# Patient Record
Sex: FEMALE | Race: BLACK OR AFRICAN AMERICAN | NOT HISPANIC OR LATINO | URBAN - METROPOLITAN AREA
[De-identification: names, ages, dates, MRNs, and addresses within clinical notes are randomized per-mention and may not be internally consistent; named-entity substitution may affect disease eponyms.]

---

## 2018-12-27 ENCOUNTER — EMERGENCY (EMERGENCY)
Facility: HOSPITAL | Age: 28
LOS: 0 days | Discharge: HOME | End: 2018-12-28
Attending: EMERGENCY MEDICINE | Admitting: EMERGENCY MEDICINE

## 2018-12-27 VITALS
TEMPERATURE: 97 F | DIASTOLIC BLOOD PRESSURE: 92 MMHG | SYSTOLIC BLOOD PRESSURE: 137 MMHG | HEART RATE: 72 BPM | RESPIRATION RATE: 18 BRPM | OXYGEN SATURATION: 98 %

## 2018-12-27 DIAGNOSIS — F17.210 NICOTINE DEPENDENCE, CIGARETTES, UNCOMPLICATED: ICD-10-CM

## 2018-12-27 DIAGNOSIS — R55 SYNCOPE AND COLLAPSE: ICD-10-CM

## 2018-12-28 VITALS
TEMPERATURE: 98 F | RESPIRATION RATE: 18 BRPM | DIASTOLIC BLOOD PRESSURE: 74 MMHG | OXYGEN SATURATION: 100 % | HEART RATE: 58 BPM | SYSTOLIC BLOOD PRESSURE: 115 MMHG

## 2018-12-28 LAB
ALBUMIN SERPL ELPH-MCNC: 4.2 G/DL — SIGNIFICANT CHANGE UP (ref 3.5–5.2)
ALP SERPL-CCNC: 72 U/L — SIGNIFICANT CHANGE UP (ref 30–115)
ALT FLD-CCNC: 12 U/L — SIGNIFICANT CHANGE UP (ref 0–41)
ANION GAP SERPL CALC-SCNC: 16 MMOL/L — HIGH (ref 7–14)
AST SERPL-CCNC: 14 U/L — SIGNIFICANT CHANGE UP (ref 0–41)
BASOPHILS # BLD AUTO: 0.02 K/UL — SIGNIFICANT CHANGE UP (ref 0–0.2)
BASOPHILS NFR BLD AUTO: 0.3 % — SIGNIFICANT CHANGE UP (ref 0–1)
BILIRUB SERPL-MCNC: 0.2 MG/DL — SIGNIFICANT CHANGE UP (ref 0.2–1.2)
BUN SERPL-MCNC: 14 MG/DL — SIGNIFICANT CHANGE UP (ref 10–20)
CALCIUM SERPL-MCNC: 9.5 MG/DL — SIGNIFICANT CHANGE UP (ref 8.5–10.1)
CHLORIDE SERPL-SCNC: 101 MMOL/L — SIGNIFICANT CHANGE UP (ref 98–110)
CHOLEST SERPL-MCNC: 195 MG/DL — SIGNIFICANT CHANGE UP (ref 100–200)
CO2 SERPL-SCNC: 23 MMOL/L — SIGNIFICANT CHANGE UP (ref 17–32)
CREAT SERPL-MCNC: 0.7 MG/DL — SIGNIFICANT CHANGE UP (ref 0.7–1.5)
D DIMER BLD IA.RAPID-MCNC: 187 NG/ML DDU — SIGNIFICANT CHANGE UP (ref 0–230)
EOSINOPHIL # BLD AUTO: 0.14 K/UL — SIGNIFICANT CHANGE UP (ref 0–0.7)
EOSINOPHIL NFR BLD AUTO: 1.8 % — SIGNIFICANT CHANGE UP (ref 0–8)
GLUCOSE SERPL-MCNC: 104 MG/DL — HIGH (ref 70–99)
HCT VFR BLD CALC: 41.9 % — SIGNIFICANT CHANGE UP (ref 37–47)
HDLC SERPL-MCNC: 39 MG/DL — LOW
HGB BLD-MCNC: 13.6 G/DL — SIGNIFICANT CHANGE UP (ref 12–16)
IMM GRANULOCYTES NFR BLD AUTO: 0.5 % — HIGH (ref 0.1–0.3)
LACTATE SERPL-SCNC: 1.3 MMOL/L — SIGNIFICANT CHANGE UP (ref 0.5–2.2)
LIPID PNL WITH DIRECT LDL SERPL: 145 MG/DL — HIGH (ref 4–129)
LYMPHOCYTES # BLD AUTO: 2.37 K/UL — SIGNIFICANT CHANGE UP (ref 1.2–3.4)
LYMPHOCYTES # BLD AUTO: 31.2 % — SIGNIFICANT CHANGE UP (ref 20.5–51.1)
MAGNESIUM SERPL-MCNC: 2.1 MG/DL — SIGNIFICANT CHANGE UP (ref 1.8–2.4)
MCHC RBC-ENTMCNC: 25.7 PG — LOW (ref 27–31)
MCHC RBC-ENTMCNC: 32.5 G/DL — SIGNIFICANT CHANGE UP (ref 32–37)
MCV RBC AUTO: 79.2 FL — LOW (ref 81–99)
MONOCYTES # BLD AUTO: 0.6 K/UL — SIGNIFICANT CHANGE UP (ref 0.1–0.6)
MONOCYTES NFR BLD AUTO: 7.9 % — SIGNIFICANT CHANGE UP (ref 1.7–9.3)
NEUTROPHILS # BLD AUTO: 4.42 K/UL — SIGNIFICANT CHANGE UP (ref 1.4–6.5)
NEUTROPHILS NFR BLD AUTO: 58.3 % — SIGNIFICANT CHANGE UP (ref 42.2–75.2)
NRBC # BLD: 0 /100 WBCS — SIGNIFICANT CHANGE UP (ref 0–0)
PHOSPHATE SERPL-MCNC: 4.3 MG/DL — SIGNIFICANT CHANGE UP (ref 2.1–4.9)
PLATELET # BLD AUTO: 353 K/UL — SIGNIFICANT CHANGE UP (ref 130–400)
POTASSIUM SERPL-MCNC: 4.2 MMOL/L — SIGNIFICANT CHANGE UP (ref 3.5–5)
POTASSIUM SERPL-SCNC: 4.2 MMOL/L — SIGNIFICANT CHANGE UP (ref 3.5–5)
PROT SERPL-MCNC: 8.3 G/DL — HIGH (ref 6–8)
RBC # BLD: 5.29 M/UL — SIGNIFICANT CHANGE UP (ref 4.2–5.4)
RBC # FLD: 14.4 % — SIGNIFICANT CHANGE UP (ref 11.5–14.5)
SODIUM SERPL-SCNC: 140 MMOL/L — SIGNIFICANT CHANGE UP (ref 135–146)
TOTAL CHOLESTEROL/HDL RATIO MEASUREMENT: 5 RATIO — SIGNIFICANT CHANGE UP (ref 4–5.5)
TRIGL SERPL-MCNC: 106 MG/DL — SIGNIFICANT CHANGE UP (ref 10–149)
TROPONIN T SERPL-MCNC: <0.01 NG/ML — SIGNIFICANT CHANGE UP
TROPONIN T SERPL-MCNC: <0.01 NG/ML — SIGNIFICANT CHANGE UP
WBC # BLD: 7.59 K/UL — SIGNIFICANT CHANGE UP (ref 4.8–10.8)
WBC # FLD AUTO: 7.59 K/UL — SIGNIFICANT CHANGE UP (ref 4.8–10.8)

## 2018-12-28 RX ORDER — IBUPROFEN 200 MG
600 TABLET ORAL ONCE
Qty: 0 | Refills: 0 | Status: COMPLETED | OUTPATIENT
Start: 2018-12-28 | End: 2018-12-28

## 2018-12-28 RX ORDER — SODIUM CHLORIDE 9 MG/ML
1000 INJECTION INTRAMUSCULAR; INTRAVENOUS; SUBCUTANEOUS ONCE
Qty: 0 | Refills: 0 | Status: COMPLETED | OUTPATIENT
Start: 2018-12-28 | End: 2018-12-28

## 2018-12-28 RX ADMIN — Medication 600 MILLIGRAM(S): at 01:25

## 2018-12-28 RX ADMIN — Medication 600 MILLIGRAM(S): at 13:56

## 2018-12-28 RX ADMIN — SODIUM CHLORIDE 2000 MILLILITER(S): 9 INJECTION INTRAMUSCULAR; INTRAVENOUS; SUBCUTANEOUS at 01:00

## 2018-12-28 NOTE — ED ADULT NURSE REASSESSMENT NOTE - NS ED NURSE REASSESS COMMENT FT1
patient placed in observation for further observation for syncope and collapse. Patient placed on cardiac and pulse ox monitor. NSR.  Patient offers no complaints at this time. Patient in stable condition and nad.  Will continue to monitor.

## 2018-12-28 NOTE — ED PROVIDER NOTE - ATTENDING CONTRIBUTION TO CARE
29 yo F, no known history, here for assessment sp syncope vs seizure -- patient reports she was feeling normal, was holding her son and then suddenly felt lightheaded, put her son down and was planning to go speak to her mom however she lost consciousness and fell forward, hitting forehead on the ground.    Episode was unwitnessed, unclear duration of LOC or if there was seizure activity, however patient was incontinent of urine.     Currently has pain to forehead and lip. Upon waking from episode was back to neurological baseline.    No recent illness, headache, chest pain, shortness of breath, preceding head trauma. Had single cocktail earlier in the evening, has had recent sleep deprivation.     Given urinary incontinence concern for complicated syncope vs seizure as opposed to simple vagal episode. Labs, CT, UA and EKG are unremarkable, but will place in obs for brief observation, consideration of neuro eval for possible seizure in setting of LOC with incontinence.

## 2018-12-28 NOTE — ED PROVIDER NOTE - OBJECTIVE STATEMENT
28yF with no pmh p/w syncopal episode this past evening. pt was holding her 4 year old, standing and talking to her mother downstairs and began to feel lightheaded and slightly nauseous/weak. she put her son down and then fell forward onto her forehead. Pt then had black spots in her vision on waking. incident was witnessed only by her 4 year old. pt states she urinated herself. no fecal incont, no tongue biting but her lower lip is sore. pt thinks she was out only a few moments. no prior syncopal episodes no sz history. pt has been very sleep deprived per her report and had 1 cocktail prior to incident. she states she felt "buzzed" at the time.

## 2018-12-28 NOTE — ED CDU PROVIDER DISPOSITION NOTE - CLINICAL COURSE
27yo woman, smoker on nexplanon, was placed in CDU for workup of syncope preceded by dizziness. She reports she has been working a lot, not sleeping much, and stressed; had an alcoholic drink prior to the episode. She did have some urinary incontinence but no tongue biting, was back to baseline after about 30 seconds, no chest pain, abd pain, fever, chills, SOB. Pt was eval in the ED with EKG, labs with cardiac enzymes, CXR which were unremarkable. D-dimer was negative. Pt comfortable on my eval, lungs CTA, CVS1S2 RRR. Repeat EKG and enzymes unchanged, pt monitored without incident and remained asymptomatic. Echo was unremarkable however pt eloped prior to being discharged. I called and spoke with her, discussed results. She will f/u PMD and return to the ED to  results.

## 2018-12-28 NOTE — ED CDU PROVIDER INITIAL DAY NOTE - ATTENDING CONTRIBUTION TO CARE
27yo woman, smoker on nexplanon, was placed in CDU for workup of syncope preceded by dizziness. She reports she has been working a lot, not sleeping much, and stressed; had an alcoholic drink prior to the episode. She did have some urinary incontinence but no tongue biting, was back to baseline after about 30 seconds, no chest pain, abd pain, fever, chills, SOB. Pt was eval in the ED with EKG, labs with cardiac enzymes, CXR which were unremarkable. D-dimer was negative. Pt comfortable on my eval, lungs CTA, CVS1S2 RRR. Plan for serial enzymes, echo, reassess.

## 2018-12-28 NOTE — ED CDU PROVIDER INITIAL DAY NOTE - OBJECTIVE STATEMENT
pt is a 28y female with no pmhx comes to ed c/o syncopal episode last night while holding her 5yo son and speaking to her mom. Pt thens suddenly started to feel weak and lightheaded and passed out. pt reports to sleep deprivation and had a cocktail before the incident happened. Pt fell on her face and was able to put her son down first. pt reports seeing black spots in vision and urinating on herself. pt was out for 1/2min and recalls having a syncopal episode about 6yrs ago. pt denies and SOB, chest pain, Vomiting, nausea, diaphoresis, abdominal pain, back pain.

## 2018-12-28 NOTE — ED ADULT NURSE NOTE - NSIMPLEMENTINTERV_GEN_ALL_ED
Implemented All Universal Safety Interventions:  [place] to call system. Call bell, personal items and telephone within reach. Instruct patient to call for assistance. Room bathroom lighting operational. Non-slip footwear when patient is off stretcher. Physically safe environment: no spills, clutter or unnecessary equipment. Stretcher in lowest position, wheels locked, appropriate side rails in place.

## 2018-12-28 NOTE — ED CDU PROVIDER INITIAL DAY NOTE - MEDICAL DECISION MAKING DETAILS
29yo woman, smoker on nexplanon, was placed in CDU for workup of syncope preceded by dizziness. She reports she has been working a lot, not sleeping much, and stressed; had an alcoholic drink prior to the episode. She did have some urinary incontinence but no tongue biting, was back to baseline after about 30 seconds, no chest pain, abd pain, fever, chills, SOB. Pt was eval in the ED with EKG, labs with cardiac enzymes, CXR which were unremarkable. D-dimer was negative. Pt comfortable on my eval, lungs CTA, CVS1S2 RRR. Plan for serial enzymes, echo, reassess.

## 2019-06-19 ENCOUNTER — EMERGENCY (EMERGENCY)
Facility: HOSPITAL | Age: 29
LOS: 0 days | Discharge: HOME | End: 2019-06-19
Admitting: EMERGENCY MEDICINE
Payer: COMMERCIAL

## 2019-06-19 VITALS
SYSTOLIC BLOOD PRESSURE: 121 MMHG | RESPIRATION RATE: 20 BRPM | OXYGEN SATURATION: 100 % | DIASTOLIC BLOOD PRESSURE: 68 MMHG | HEART RATE: 80 BPM

## 2019-06-19 DIAGNOSIS — J45.909 UNSPECIFIED ASTHMA, UNCOMPLICATED: ICD-10-CM
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
## 2019-06-19 DIAGNOSIS — F17.200 NICOTINE DEPENDENCE, UNSPECIFIED, UNCOMPLICATED: ICD-10-CM

## 2019-06-19 DIAGNOSIS — S39.012A STRAIN OF MUSCLE, FASCIA AND TENDON OF LOWER BACK, INITIAL ENCOUNTER: ICD-10-CM
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
## 2019-06-19 DIAGNOSIS — M54.9 DORSALGIA, UNSPECIFIED: ICD-10-CM
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
## 2019-06-19 PROCEDURE — 99283 EMERGENCY DEPT VISIT LOW MDM: CPT

## 2019-06-19 RX ORDER — KETOROLAC TROMETHAMINE 30 MG/ML
30 SYRINGE (ML) INJECTION ONCE
Refills: 0 | Status: COMPLETED | OUTPATIENT
Start: 2019-06-19 | End: 2019-06-19

## 2019-06-19 NOTE — ED PROVIDER NOTE - CLINICAL SUMMARY MEDICAL DECISION MAKING FREE TEXT BOX
NSAIDS prn; rehab referral; pt will follow up with PCP in 2-3 days; any new or worsening symptoms, pt will return to ER.
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
## 2019-06-19 NOTE — ED PROVIDER NOTE - MUSCULOSKELETAL, MLM
Spine appears normal, range of motion is not limited, no midline tenderness.  (+) bilateral lower lumbar paraspinal tenderness with spasm.
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
## 2019-06-19 NOTE — ED PROVIDER NOTE - NSFOLLOWUPINSTRUCTIONS_ED_ALL_ED_FT
****Ibuprofen 600mg every 6 hours as needed****      Low Back Sprain  Image   A sprain is a stretch or tear in the bands of tissue that hold bones and joints together (ligaments). Sprains of the lower back (lumbar spine) are a common cause of low back pain. A sprain occurs when ligaments are overextended or stretched beyond their limits. The ligaments can become inflamed, resulting in pain and sudden muscle tightening (spasms). A sprain can be caused by an injury (trauma), or it can develop gradually due to overuse.    There are three types of sprains:  Grade 1 is a mild sprain involving an overstretched ligament or a very slight tear of the ligament.  Grade 2 is a moderate sprain involving a partial tear of the ligament.  Grade 3 is a severe sprain involving a complete tear of the ligament.  What are the causes?  This condition may be caused by:  Trauma, such as a fall or a hit to the body.  Twisting or overstretching the back. This may result from doing activities that require a lot of energy, such as lifting heavy objects.  What increases the risk?  The following factors may increase your risk of getting this condition:  Playing contact sports.  Participating in sports or activities that put excessive stress on the back and require a lot of bending and twisting, including:  Lifting weights or heavy objects.  Gymnastics.  Soccer.  Figure skating.  Snowboarding.  Being overweight or obese.  Having poor strength and flexibility.  What are the signs or symptoms?  Symptoms of this condition may include:  Sharp or dull pain in the lower back that does not go away. Pain may extend to the buttocks.  Stiffness.  Limited range of motion.  Inability to stand up straight due to stiffness or pain.  Muscle spasms.  How is this diagnosed?  ImageThis condition may be diagnosed based on:  Your symptoms.  Your medical history.  A physical exam.  Your health care provider may push on certain areas of your back to determine the source of your pain.  You may be asked to bend forward, backward, and side to side to assess the severity of your pain and your range of motion.  Imaging tests, such as:  X-rays.  MRI.  How is this treated?  Treatment for this condition may include:  Applying heat and cold to the affected area.  Medicines to help relieve pain and to relax your muscles (muscle relaxants).  NSAIDs to help reduce swelling and discomfort.  Physical therapy.  When your symptoms improve, it is important to gradually return to your normal routine as soon as possible to reduce pain, avoid stiffness, and avoid loss of muscle strength. Generally, symptoms should improve within 6 weeks of treatment. However, recovery time varies.    Follow these instructions at home:  Managing pain, stiffness, and swelling     Image   If directed, apply ice to the injured area during the first 24 hours after your injury.  Put ice in a plastic bag.  Place a towel between your skin and the bag.  Leave the ice on for 20 minutes, 2–3 times a day.  If directed, apply heat to the affected area as often as told by your health care provider. Use the heat source that your health care provider recommends, such as a moist heat pack or a heating pad.  Place a towel between your skin and the heat source.  Leave the heat on for 20–30 minutes.  Remove the heat if your skin turns bright red. This is especially important if you are unable to feel pain, heat, or cold. You may have a greater risk of getting burned.  Activity     Rest and return to your normal activities as told by your health care provider. Ask your health care provider what activities are safe for you.  Avoid activities that take a lot of effort (are strenuous) for as long as told by your health care provider.  Do exercises as told by your health care provider.  General instructions     Image   Take over-the-counter and prescription medicines only as told by your health care provider.  If you have questions or concerns about safety while taking pain medicine, talk with your health care provider.  Do not drive or operate heavy machinery until you know how your pain medicine affects you.  Do not use any tobacco products, such as cigarettes, chewing tobacco, and e-cigarettes. Tobacco can delay bone healing. If you need help quitting, ask your health care provider.  Keep all follow-up visits as told by your health care provider. This is important.  How is this prevented?  Warm up and stretch before being active.  Cool down and stretch after being active.  Give your body time to rest between periods of activity.  Avoid:  Being physically inactive for long periods at a time.  Exercising or playing sports when you are tired or in pain.  Use correct form when playing sports and lifting heavy objects.  Use good posture when sitting and standing.  Maintain a healthy weight.  Sleep on a mattress with medium firmness to support your back.  Make sure to use equipment that fits you, including shoes that fit well.  Be safe and responsible while being active to avoid falls.  Do at least 150 minutes of moderate-intensity exercise each week, such as brisk walking or water aerobics. Try a form of exercise that takes stress off your back, such as swimming or stationary cycling.  Maintain physical fitness, including:  Strength. In particular, develop and maintain strong abdominal muscles.  Flexibility.  Cardiovascular fitness.  Endurance.  Contact a health care provider if:  Your back pain does not improve after 6 weeks of treatment.  Your symptoms get worse.  Get help right away if:  Your back pain is severe.  You are unable to stand or walk.  You develop pain in your legs.  You develop weakness in your buttocks or legs.  You have difficulty controlling when you urinate or when you have a bowel movement.  This information is not intended to replace advice given to you by your health care provider. Make sure you discuss any questions you have with your health care provider.    Document Released: 12/18/2006 Document Revised: 08/24/2017 Document Reviewed: 09/28/2016  [employer] Interactive Patient Education © 2019 [employer]

## 2022-08-26 NOTE — ED ADULT TRIAGE NOTE - NS ED NOTE AC HIGH RISK COUNTRIES
[de-identified] : 
No

## 2024-11-08 ENCOUNTER — EMERGENCY (EMERGENCY)
Facility: HOSPITAL | Age: 34
LOS: 0 days | Discharge: HOME | End: 2024-11-08
Attending: STUDENT IN AN ORGANIZED HEALTH CARE EDUCATION/TRAINING PROGRAM
Payer: COMMERCIAL

## 2024-11-08 VITALS
RESPIRATION RATE: 18 BRPM | SYSTOLIC BLOOD PRESSURE: 121 MMHG | OXYGEN SATURATION: 99 % | TEMPERATURE: 99 F | DIASTOLIC BLOOD PRESSURE: 79 MMHG | WEIGHT: 184.09 LBS | HEART RATE: 75 BPM

## 2024-11-08 PROBLEM — Z78.9 OTHER SPECIFIED HEALTH STATUS: Chronic | Status: ACTIVE | Noted: 2019-06-19

## 2024-11-08 LAB
ALBUMIN SERPL ELPH-MCNC: 4 G/DL — SIGNIFICANT CHANGE UP (ref 3.5–5.2)
ALP SERPL-CCNC: 61 U/L — SIGNIFICANT CHANGE UP (ref 30–115)
ALT FLD-CCNC: 10 U/L — SIGNIFICANT CHANGE UP (ref 0–41)
ANION GAP SERPL CALC-SCNC: 13 MMOL/L — SIGNIFICANT CHANGE UP (ref 7–14)
APPEARANCE UR: CLEAR — SIGNIFICANT CHANGE UP
AST SERPL-CCNC: 15 U/L — SIGNIFICANT CHANGE UP (ref 0–41)
BACTERIA # UR AUTO: NEGATIVE /HPF — SIGNIFICANT CHANGE UP
BASOPHILS # BLD AUTO: 0.02 K/UL — SIGNIFICANT CHANGE UP (ref 0–0.2)
BASOPHILS NFR BLD AUTO: 0.4 % — SIGNIFICANT CHANGE UP (ref 0–1)
BILIRUB SERPL-MCNC: <0.2 MG/DL — SIGNIFICANT CHANGE UP (ref 0.2–1.2)
BILIRUB UR-MCNC: NEGATIVE — SIGNIFICANT CHANGE UP
BLD GP AB SCN SERPL QL: SIGNIFICANT CHANGE UP
BUN SERPL-MCNC: 10 MG/DL — SIGNIFICANT CHANGE UP (ref 10–20)
CALCIUM SERPL-MCNC: 8.8 MG/DL — SIGNIFICANT CHANGE UP (ref 8.4–10.4)
CAST: 0 /LPF — SIGNIFICANT CHANGE UP (ref 0–4)
CHLORIDE SERPL-SCNC: 105 MMOL/L — SIGNIFICANT CHANGE UP (ref 98–110)
CO2 SERPL-SCNC: 20 MMOL/L — SIGNIFICANT CHANGE UP (ref 17–32)
COLOR SPEC: YELLOW — SIGNIFICANT CHANGE UP
CREAT SERPL-MCNC: 0.6 MG/DL — LOW (ref 0.7–1.5)
DIFF PNL FLD: ABNORMAL
EGFR: 121 ML/MIN/1.73M2 — SIGNIFICANT CHANGE UP
EOSINOPHIL # BLD AUTO: 0.17 K/UL — SIGNIFICANT CHANGE UP (ref 0–0.7)
EOSINOPHIL NFR BLD AUTO: 3.1 % — SIGNIFICANT CHANGE UP (ref 0–8)
GLUCOSE SERPL-MCNC: 112 MG/DL — HIGH (ref 70–99)
GLUCOSE UR QL: NEGATIVE MG/DL — SIGNIFICANT CHANGE UP
HCG SERPL-ACNC: 4132 MIU/ML — HIGH
HCT VFR BLD CALC: 39.7 % — SIGNIFICANT CHANGE UP (ref 37–47)
HGB BLD-MCNC: 12.6 G/DL — SIGNIFICANT CHANGE UP (ref 12–16)
IMM GRANULOCYTES NFR BLD AUTO: 0.4 % — HIGH (ref 0.1–0.3)
KETONES UR-MCNC: NEGATIVE MG/DL — SIGNIFICANT CHANGE UP
LEUKOCYTE ESTERASE UR-ACNC: NEGATIVE — SIGNIFICANT CHANGE UP
LYMPHOCYTES # BLD AUTO: 1.89 K/UL — SIGNIFICANT CHANGE UP (ref 1.2–3.4)
LYMPHOCYTES # BLD AUTO: 34.7 % — SIGNIFICANT CHANGE UP (ref 20.5–51.1)
MCHC RBC-ENTMCNC: 26.7 PG — LOW (ref 27–31)
MCHC RBC-ENTMCNC: 31.7 G/DL — LOW (ref 32–37)
MCV RBC AUTO: 84.1 FL — SIGNIFICANT CHANGE UP (ref 81–99)
MONOCYTES # BLD AUTO: 0.48 K/UL — SIGNIFICANT CHANGE UP (ref 0.1–0.6)
MONOCYTES NFR BLD AUTO: 8.8 % — SIGNIFICANT CHANGE UP (ref 1.7–9.3)
NEUTROPHILS # BLD AUTO: 2.86 K/UL — SIGNIFICANT CHANGE UP (ref 1.4–6.5)
NEUTROPHILS NFR BLD AUTO: 52.6 % — SIGNIFICANT CHANGE UP (ref 42.2–75.2)
NITRITE UR-MCNC: NEGATIVE — SIGNIFICANT CHANGE UP
NRBC # BLD: 0 /100 WBCS — SIGNIFICANT CHANGE UP (ref 0–0)
PH UR: 6 — SIGNIFICANT CHANGE UP (ref 5–8)
PLATELET # BLD AUTO: 328 K/UL — SIGNIFICANT CHANGE UP (ref 130–400)
PMV BLD: 10.1 FL — SIGNIFICANT CHANGE UP (ref 7.4–10.4)
POTASSIUM SERPL-MCNC: 4.2 MMOL/L — SIGNIFICANT CHANGE UP (ref 3.5–5)
POTASSIUM SERPL-SCNC: 4.2 MMOL/L — SIGNIFICANT CHANGE UP (ref 3.5–5)
PROT SERPL-MCNC: 7.4 G/DL — SIGNIFICANT CHANGE UP (ref 6–8)
PROT UR-MCNC: SIGNIFICANT CHANGE UP MG/DL
RBC # BLD: 4.72 M/UL — SIGNIFICANT CHANGE UP (ref 4.2–5.4)
RBC # FLD: 14.1 % — SIGNIFICANT CHANGE UP (ref 11.5–14.5)
RBC CASTS # UR COMP ASSIST: 3 /HPF — SIGNIFICANT CHANGE UP (ref 0–4)
SODIUM SERPL-SCNC: 138 MMOL/L — SIGNIFICANT CHANGE UP (ref 135–146)
SP GR SPEC: 1.02 — SIGNIFICANT CHANGE UP (ref 1–1.03)
SQUAMOUS # UR AUTO: 2 /HPF — SIGNIFICANT CHANGE UP (ref 0–5)
UROBILINOGEN FLD QL: 1 MG/DL — SIGNIFICANT CHANGE UP (ref 0.2–1)
WBC # BLD: 5.44 K/UL — SIGNIFICANT CHANGE UP (ref 4.8–10.8)
WBC # FLD AUTO: 5.44 K/UL — SIGNIFICANT CHANGE UP (ref 4.8–10.8)
WBC UR QL: 3 /HPF — SIGNIFICANT CHANGE UP (ref 0–5)

## 2024-11-08 PROCEDURE — 81001 URINALYSIS AUTO W/SCOPE: CPT

## 2024-11-08 PROCEDURE — 86901 BLOOD TYPING SEROLOGIC RH(D): CPT

## 2024-11-08 PROCEDURE — 85025 COMPLETE CBC W/AUTO DIFF WBC: CPT

## 2024-11-08 PROCEDURE — 36415 COLL VENOUS BLD VENIPUNCTURE: CPT

## 2024-11-08 PROCEDURE — 99285 EMERGENCY DEPT VISIT HI MDM: CPT

## 2024-11-08 PROCEDURE — 80053 COMPREHEN METABOLIC PANEL: CPT

## 2024-11-08 PROCEDURE — 84702 CHORIONIC GONADOTROPIN TEST: CPT

## 2024-11-08 PROCEDURE — 76817 TRANSVAGINAL US OBSTETRIC: CPT

## 2024-11-08 PROCEDURE — 87086 URINE CULTURE/COLONY COUNT: CPT

## 2024-11-08 PROCEDURE — 86900 BLOOD TYPING SEROLOGIC ABO: CPT

## 2024-11-08 PROCEDURE — 76817 TRANSVAGINAL US OBSTETRIC: CPT | Mod: 26

## 2024-11-08 PROCEDURE — 99284 EMERGENCY DEPT VISIT MOD MDM: CPT | Mod: 25

## 2024-11-08 PROCEDURE — 86850 RBC ANTIBODY SCREEN: CPT

## 2024-11-08 RX ORDER — IBUPROFEN 200 MG
1 TABLET ORAL
Qty: 8 | Refills: 0
Start: 2024-11-08 | End: 2024-11-09

## 2024-11-08 RX ORDER — ACETAMINOPHEN 500 MG
975 TABLET ORAL ONCE
Refills: 0 | Status: COMPLETED | OUTPATIENT
Start: 2024-11-08 | End: 2024-11-08

## 2024-11-08 RX ORDER — ONDANSETRON HYDROCHLORIDE 2 MG/ML
4 INJECTION, SOLUTION INTRAMUSCULAR; INTRAVENOUS ONCE
Refills: 0 | Status: COMPLETED | OUTPATIENT
Start: 2024-11-08 | End: 2024-11-08

## 2024-11-08 RX ORDER — MISOPROSTOL 200 UG/1
4 TABLET ORAL
Qty: 8 | Refills: 0
Start: 2024-11-08

## 2024-11-08 RX ORDER — MIFEPRISTONE 200 MG
200 TABLET ORAL ONCE
Refills: 0 | Status: DISCONTINUED | OUTPATIENT
Start: 2024-11-08 | End: 2024-11-08

## 2024-11-08 RX ADMIN — Medication 975 MILLIGRAM(S): at 15:21

## 2024-11-08 NOTE — ED PROVIDER NOTE - PHYSICAL EXAMINATION
VITAL SIGNS: I have reviewed nursing notes and confirm.  CONSTITUTIONAL: Non-toxic, in NAD  SKIN: Warm dry, normal skin turgor  HEAD: NCAT  EYES: No conjunctival injection, scleral anicteric  ENT: Moist mucous membranes, normal pharynx with no erythema or exudates  NECK: Supple; full ROM. Nontender. No cervical LAD  CARD: RRR, no murmurs, rubs or gallops  RESP: Clear to ausculation bilaterally.  No rales, rhonchi, or wheezing.  ABD: Soft, non-distended, non-tender, no rebound or guarding. No CVA tenderness  EXT: Full ROM

## 2024-11-08 NOTE — CONSULT NOTE ADULT - SUBJECTIVE AND OBJECTIVE BOX
PGY2    Chief Complaint: vaginal bleeding, pregnancy    HPI: 33 y/o , LMP  presents with vaginal bleeding during pregnancy. Started bleeding earlier this week, went to [facility] ED, at that time bhcg was found to be approx 5000 with just gestational sac visualized on sono. States was seen at [facility] clinic yesterday, TVUS showed empty gestational sac again, bhcg approx 6400. Has been having moderate bleeding all week, no heavy vaginal bleeding or passage of clots. Was initially planned and desired pregnancy however patient desiring definitive management of suspected missed . Denies fevers, chills, chest pain, SOB, abdominal pain. Denies h/o medical problems.     Ob/Gyn History:  , 3x eTOP w/ 3c D&C, 1x sAb    LMP -     Known h/o fibroid. Denies history of ovarian cysts, abnormal paps, or STIs    Denies the following: constitutional symptoms, visual symptoms, cardiovascular symptoms, respiratory symptoms, GI symptoms, musculoskeletal symptoms, skin symptoms, neurologic symptoms, hematologic symptoms, allergic symptoms, psychiatric symptoms  Except any pertinent positives listed.     Home Medications: None    Allergies: No Known Allergies    PAST MEDICAL & SURGICAL HISTORY:  No pertinent past medical history  H/o D&C x3    FAMILY HISTORY:  No pertinent family history in first degree relatives    SOCIAL HISTORY: Denies cigarette use, alcohol use, or illicit drug use    Vital Signs Last 24 Hrs  T(F): 98.7 (2024 14:14), Max: 98.7 (2024 14:14)  HR: 75 (2024 14:14) (75 - 75)  BP: 121/79 (2024 14:14) (121/79 - 121/79)  RR: 18 (2024 14:14) (18 - 18)    General Appearance - AAOx3, NAD  Abdomen - Soft, nontender, nondistended, no rebound, no rigidity, no guarding  GYN/Pelvis:  External genitalia: Normal  Vagina: 5cc dark red blood  Cervix: soft, closed, no active bleeding  Uterus: small, nontender  Adnexa: nontender    LABS:                        12.6   5.44  )-----------( 328      ( 2024 15:51 )             39.7     HCG Quantitative, Serum: 4132.0 mIU/mL (24 @ 15:52)    ABO RH Interpretation: A POS [2024 16:52  FABIENNEERTong  No historical record of blood group and Rh, requires a second sample for  retype prior to the release of any blood products.  For prenatal, a  second sample on next visit.] (24 @ 15:51)  Antibody Screen: NEG (24 @ 15:51)        138  |  105  |  10  ----------------------------<  112[H]  4.2   |  20  |  0.6[L]    Ca    8.8      2024 15:51    TPro  7.4  /  Alb  4.0  /  TBili  <0.2  /  DBili  x   /  AST  15  /  ALT  10  /  AlkPhos  61        Urinalysis Basic - ( 2024 15:51 )    Color: x / Appearance: x / SG: x / pH: x  Gluc: 112 mg/dL / Ketone: x  / Bili: x / Urobili: x   Blood: x / Protein: x / Nitrite: x   Leuk Esterase: x / RBC: x / WBC x   Sq Epi: x / Non Sq Epi: x / Bacteria: x    RADIOLOGY & ADDITIONAL STUDIES:  < from: US Transvaginal, OB (24 @ 16:36) >    ACC: [id number] EXAM:  US OB TRANSVAGINAL   ORDERED BY: DEL PAYTON     PROCEDURE DATE:  2024          INTERPRETATION:  CLINICAL INFORMATION: Vaginal bleeding.    LMP: 2024    Estimated Gestational Age by LMP: 8 weeks 4 days.    COMPARISON: None available.    Endovaginal and transabdominal pelvic sonogram. Color and Spectral   Doppler was performed.    FINDINGS:  Uterus: Measures 9.7 x 6.7 x 7.3 cm. Single intrauterine gestational sac   in the lower uterus measures 1.42 cm for an estimated gestational age of   5 weeks, 4 days. No definite yolk sac. No fetal pole. Subchorionic   hemorrhage present. 3.6 cm fundal fibroid.    Right ovary: 2.1 cm x 1.8 cm x 1.7 cm. Within normal limits.  Left ovary: 3.0 cm x 1.8 cm x 2.7 cm. Corpus luteal cyst measures 2.3 cm.    Fluid: None.    IMPRESSION:    Gestational sac within the lower uterus with an estimated gestational age   of 5 weeks, 4 days. No definite yolk sac or fetal pole. Close follow-up   recommended with serial beta hCG levels and short interval follow-up   ultrasound.    --- End of Report ---    MARIANA ELLIS MD; Resident Radiologist  This document has been electronically signed.  CATHY HENSLEY MD; Attending Radiologist  This document has been electronically signed. 2024  6:07PM    < end of copied text >

## 2024-11-08 NOTE — ED PROVIDER NOTE - PROGRESS NOTE DETAILS
SO: OB/GYN Consulted and will see patient bedside ffearns: received signout from ; pt 6 weeks pregnant with vb with gestational sac w/o fetal pull or yolk sac; as per pt bhcg at another hospital 6000 - today 4000; plan: gyn consult for final dispo and trend bhcg;

## 2024-11-08 NOTE — ED PROVIDER NOTE - CLINICAL SUMMARY MEDICAL DECISION MAKING FREE TEXT BOX
35 yr old f that presents with vaginal bleeding, currently pregnant. labs,imaging, ua, pain management. pt evaluated and cleared by ob/gyn. concern for abnormal pregnancy. pt give ob/gyn follow up. pt hemodynamically stable and not in any distress. Labs  were ordered and reviewed.  Imaging was ordered and reviewed by me.  Appropriate medications for patient's presenting complaints were ordered and effects were reassessed.  Patient's records (prior hospital, ED visit, and/or nursing home notes if available) were reviewed.  Additional history was obtained from EMS, family, and/or PCP (where available).  Escalation to admission/observation was considered.  However patient feels much better and is comfortable with discharge.  Appropriate follow-up was arranged.     I have discussed the discharge plan with the patient. The patient agrees with the plan, as discussed.  The patient understands Emergency Department diagnosis is a preliminary diagnosis often based on limited information and that the patient must adhere to the follow-up plan as discussed.  The patient understands that if the symptoms worsen or if prescribed medications do not have the desired/planned effect that the patient may return to the Emergency Department at any time for further evaluation and treatment.

## 2024-11-08 NOTE — ED PROVIDER NOTE - PATIENT PORTAL LINK FT
You can access the FollowMyHealth Patient Portal offered by [facility] by registering at the following website: [email or web address]. By joining [employer]’s FollowMyHealth portal, you will also be able to view your health information using other applications (apps) compatible with our system.

## 2024-11-08 NOTE — ED ADULT TRIAGE NOTE - CHIEF COMPLAINT QUOTE
Presented to ED c/o vaginal bleeding and lower abdominal pain rad to back x1 week. Endorsed she is 8 weeks pregnant.

## 2024-11-08 NOTE — ED PROVIDER NOTE - NSFOLLOWUPCLINICS_GEN_ALL_ED_FT
[facility]  OB/GYN  [address] [place] [address]  Phone: [phone]  Fax:   Follow Up Time: 1-3 Days

## 2024-11-08 NOTE — ED PROVIDER NOTE - ATTENDING CONTRIBUTION TO CARE
35-year-old female  who presents vaginal bleeding.  As per patient admits the last couple of days she has been having vaginal bleeding that has gotten progressively worse and today developed abdominal cramping similar to her menses.  Of note patient was seen at [facility] last week for similar complaint at that point pt states that they only visualized a sac. Pt also states that she was informed that her HCG was decreasing. Pt denies any nausea, vomiting but does endorse vaginal bleeding.     VITAL SIGNS: I have reviewed nursing notes and confirm.  CONSTITUTIONAL: non-toxic, well appearing  SKIN: no rash, no petechiae.  EYES: EOMI, pink conjunctiva, anicteric  ENT: tongue midline, no exudates, MMM  NECK: Supple; no meningismus, no JVD  CARD: RRR, no murmurs, equal radial pulses bilaterally 2+  RESP: CTAB, no respiratory distress  ABD: Soft, non-tender, non-distended, no peritoneal signs, no HSM, no CVA tenderness  : pt would like to defer gyn exam at this point     35 yr old f that presents with vaginal bleeding, currently pregnant. labs,imaging, ua, pain management. reassess. dispo pending.

## 2024-11-08 NOTE — CONSULT NOTE ADULT - ASSESSMENT
33 y/o , LMP , with vaginal bleeding and decreasing bhcg, likely early pregnancy loss vs. abnormal pregnancy, patient desiring definitive management.     - The patient was counseled about her pregnancy options of repeat bhcg, repeat sonogram, expectant management She expressed her sure decision for pregnancy termination.  - The patient was counseled on medication  wishes to proceed with medication termination of pregnancy.  - Risk of medication , including but not limited to, infection, retained products of conception, continuing pregnancy, hemorrhage, need for surgical  or D&C for incomplete  (~5%), potential for teratogenesis in this pregnancy if treatment unsuccessful, and death discussed. Discussed side effects, warning symptoms and pain management.  - H/h 12.6/39.7, Rh pos  - They were instructed to take 800mcg misprostol vaginally. If expected bleeding and cramping does not occur, can take second dose vaginally or buccally 4 hrs later.   - They were given prescriptions for ibuprofen 800 mg for pain as well as promethazine for nausea. They were advised to take a dose of promethazine with their first dose of ibuprofen (before or at the time of taking misoprostol) to potentially help with pain.  - Extensive bleeding and infection precautions were reviewed  - Follow up at [facility] in 1-2 weeks   - Dispo per ED
Right breast lumpectomy with magseed removal

## 2024-11-08 NOTE — ED PROVIDER NOTE - OBJECTIVE STATEMENT
Patient is a 35-year-old female  (3 prior abortions and 1 miscarriage) who presents vaginal bleeding. Patient endorses scant vaginal bleeding for the past two weeks that has become progressively heavier the past two days. Patient was seen at [facility] a few days ago with the same complaint; ultrasound at that time was inconclusive and betaHCG was >6000. Patient denies chest pain, shortness of breath, nausea, vomiting, dysuria, flank tenderness or additional concerns.

## 2024-11-09 LAB
CULTURE RESULTS: SIGNIFICANT CHANGE UP
SPECIMEN SOURCE: SIGNIFICANT CHANGE UP

## 2025-01-23 ENCOUNTER — APPOINTMENT (OUTPATIENT)
Dept: OBGYN | Facility: CLINIC | Age: 35
End: 2025-01-23